# Patient Record
Sex: FEMALE | Race: BLACK OR AFRICAN AMERICAN | NOT HISPANIC OR LATINO | Employment: UNEMPLOYED | URBAN - METROPOLITAN AREA
[De-identification: names, ages, dates, MRNs, and addresses within clinical notes are randomized per-mention and may not be internally consistent; named-entity substitution may affect disease eponyms.]

---

## 2017-01-11 ENCOUNTER — DOCUMENTATION (OUTPATIENT)
Dept: PHYSICAL THERAPY | Facility: HOSPITAL | Age: 52
End: 2017-01-11

## 2017-01-11 NOTE — THERAPY DISCHARGE NOTE
Outpatient Rehabilitation - Wound/Debridement Discharge Summary        Patient Name: Susanne Severino  : 1965  MRN: [id number]  Today's Date: 2017                Admit Date: (Not on file)    Visit Dx:  No diagnosis found.    There is no problem list on file for this patient.       Past Medical History   Diagnosis Date   • Hypertension         No past surgical history on file.      EVALUATION                LDA Wound                  Wound 10/30/16 1400 Left lower leg ulceration, arterial    Wound - Properties Group Date first assessed: 10/30/16  - Time first assessed: 1400  -MC Side: Left  -MC Orientation: lower  -MC, superior  Location: leg  -MC Type: ulceration, arterial  -MC    Wound 10/30/16 1400 Left lower leg ulceration, arterial    Wound - Properties Group Date first assessed: 10/30/16  - Time first assessed: 1400  -MC Side: Left  -MC Orientation: lower  -MC, inferior  Location: leg  -MC Type: ulceration, arterial  -MC      User Key  (r) = Recorded By, (t) = Taken By, (c) = Cosigned By    Initials Name Provider Type    BOBY Camacho, PT Physical Therapist            WOUND DEBRIDEMENT                     Recommendation and Plan      Goals        PT OP Goals       17 1610          PT Short Term Goals    STG 1 Patient and/ or caregiver able to verbalize signs and symptoms of infection.  -      STG 1 Progress Partially Met  -      STG 2 Decrease non-viable / necrotic tissue by 90% to facilitate clean wound bed for healing.  -      STG 2 Progress Not Met  -MC      STG 3 Decrease wound dimensions by 25% as evidence of wound closure.  -      STG 3 Progress Not Met  -MC      STG 4 Patient to report decrease in pain to 4/10, to facilitate improved functional mobility.  -      STG 4 Progress Partially Met  -MC      STG 5 Patient independent and compliant with initial home exercise program focused on range of motion, and flexibility to improve blood flow to facilitate healing.   "-      STG 5 Progress Not Met  -MC      Long Term Goals    LTG 1 Patient and/ or caregiver independent with clean dressing changes.  -      LTG 1 Progress Met  -MC      LTG 2 Decrease non-viable / necrotic tissue by 100% to facilitate clean wound bed for healing.  -MC      LTG 2 Progress Partially Met  -MC      LTG 3 Decrease wound dimensions by 75% as evidence of wound closure.  -MC      LTG 3 Progress Not Met  -MC      LTG 4 Patient to report decrease in pain to 2/10, to facilitate improved functional mobility.  -      LTG 4 Progress Partially Met  -MC        User Key  (r) = Recorded By, (t) = Taken By, (c) = Cosigned By    Initials Name Provider Type    BOBY Camacho PT Physical Therapist          Time Calculation:                      OP Discharge Summary       01/11/17 1610          OP PT Discharge Summary    Date of Discharge 01/11/17  -      Reason for Discharge other (comment)   Pt did not return or call for follow up appointments. Pt has been a \"no call, no show\" for her last three appointments and would require an additional MD order to receive more care at this time.  -      Outcomes Achieved Patient able to partially acheive established goals  -      Discharge Destination Home without follow-up  -        User Key  (r) = Recorded By, (t) = Taken By, (c) = Cosigned By    Initials Name Provider Type    BOBY Camacho PT Physical Therapist          Irma Camacho, PT  1/11/2017       "

## 2019-07-09 ENCOUNTER — TELEPHONE (OUTPATIENT)
Dept: URGENT CARE | Facility: CLINIC | Age: 54
End: 2019-07-09

## 2019-07-09 NOTE — TELEPHONE ENCOUNTER
PT called requesting RF, pt was seen 6/21. Advised they would need to come back in and been seen for extended treatment.

## 2019-07-12 PROBLEM — T14.8XXA INFECTED OPEN WOUND: Status: ACTIVE | Noted: 2019-07-12

## 2019-07-12 PROBLEM — L08.9 INFECTED OPEN WOUND: Status: ACTIVE | Noted: 2019-07-12

## 2019-07-12 PROCEDURE — 87070 CULTURE OTHR SPECIMN AEROBIC: CPT | Performed by: NURSE PRACTITIONER

## 2019-07-16 ENCOUNTER — TELEPHONE (OUTPATIENT)
Dept: URGENT CARE | Facility: CLINIC | Age: 54
End: 2019-07-16

## 2019-07-16 NOTE — TELEPHONE ENCOUNTER
Pt returned called, reports wound is improving and feeling better.  Gave her wound culture results, she verbalized understanding.  Will follow up with PCP.